# Patient Record
Sex: FEMALE | Race: WHITE | URBAN - METROPOLITAN AREA
[De-identification: names, ages, dates, MRNs, and addresses within clinical notes are randomized per-mention and may not be internally consistent; named-entity substitution may affect disease eponyms.]

---

## 2017-09-26 ENCOUNTER — OFFICE VISIT (OUTPATIENT)
Dept: ORTHOPEDIC SURGERY | Age: 52
End: 2017-09-26

## 2017-09-26 VITALS
SYSTOLIC BLOOD PRESSURE: 124 MMHG | BODY MASS INDEX: 22.49 KG/M2 | DIASTOLIC BLOOD PRESSURE: 82 MMHG | HEIGHT: 65 IN | WEIGHT: 135 LBS | HEART RATE: 76 BPM

## 2017-09-26 DIAGNOSIS — M72.2 PLANTAR FASCIITIS, BILATERAL: ICD-10-CM

## 2017-09-26 DIAGNOSIS — M79.671 BILATERAL FOOT PAIN: Primary | ICD-10-CM

## 2017-09-26 DIAGNOSIS — M79.672 BILATERAL FOOT PAIN: Primary | ICD-10-CM

## 2017-09-26 PROCEDURE — 73630 X-RAY EXAM OF FOOT: CPT | Performed by: ORTHOPAEDIC SURGERY

## 2017-09-26 PROCEDURE — 99204 OFFICE O/P NEW MOD 45 MIN: CPT | Performed by: ORTHOPAEDIC SURGERY

## 2017-09-27 ENCOUNTER — TELEPHONE (OUTPATIENT)
Dept: ORTHOPEDIC SURGERY | Age: 52
End: 2017-09-27

## 2017-09-28 DIAGNOSIS — M72.2 PLANTAR FASCIITIS, BILATERAL: Primary | ICD-10-CM

## 2017-09-28 RX ORDER — DICLOFENAC SODIUM 75 MG/1
75 TABLET, DELAYED RELEASE ORAL 2 TIMES DAILY
Qty: 60 TABLET | Refills: 2 | Status: SHIPPED | OUTPATIENT
Start: 2017-09-28

## 2017-10-02 ENCOUNTER — HOSPITAL ENCOUNTER (OUTPATIENT)
Dept: PHYSICAL THERAPY | Age: 52
Discharge: HOME | End: 2017-10-02

## 2017-10-02 NOTE — PLAN OF CARE
ultimate goal would be to get back to running. Relevant Medical History: History of Cancer   Functional Disability Index:PT G-Codes  Functional Assessment Tool Used: LEFS  Score: 25% deficit  Functional Limitation: Mobility: Walking and moving around  Mobility: Walking and Moving Around Current Status (): At least 20 percent but less than 40 percent impaired, limited or restricted  Mobility: Walking and Moving Around Goal Status (): At least 1 percent but less than 20 percent impaired, limited or restricted     Pain Scale:0-6/10  Easing factors: Resting  Provocative factors: Walking and standing; first step in the morning    Type: []Constant   [x]Intermittent  []Radiating []Localized []other:     Numbness/Tingling: Denies     Occupation/School:  at [address] [place] [address] Level of Function: Independent with ADLs and IADLs,     OBJECTIVE:     ROM  10/2 PROM AROM Comments    Left Right Left Right    Dorsiflexion   5 10    Plantarflexion   25 30    Inversion   20 25    Eversion   10 15                      Strength 10/2 Left Right Comments   Dorsiflexion 5 5    Plantarflexion 4+ 4+    Inversion 5 5    Eversion 5- 4+      Reflexes/Sensation: 10/2   [x]Dermatomes/Myotomes intact    [x]Reflexes equal and normal bilaterally   []Other:    Joint mobility: 10/2   [x]Normal    []Hypo   []Hyper     Palpation: 2/3 TTP Medial mid arch of foot bilateral  10/2    Functional Mobility/Transfers: Independent with increased pain; unable to jog or walk long distances without pain  10/2    Posture: Forward head and rounded shoulders  10/2    Gait: (include devices/WB status) Antalgic gait present; decreased heel strike  10/2                       [x] Patient history, allergies, meds reviewed. Medical chart reviewed. See intake form. 10/2    Review Of Systems (ROS):  [x]Performed Review of systems (Integumentary, CardioPulmonary, Neurological) by intake and observation.  Intake form has been scanned into medical record. Patient has been instructed to contact their primary care physician regarding ROS issues if not already being addressed at this time. 10/2    Co-morbidities/Complexities (which will affect course of rehabilitation):   [x]None           Arthritic conditions   []Rheumatoid arthritis (M05.9)  []Osteoarthritis (M19.91)   Cardiovascular conditions   []Hypertension (I10)  []Hyperlipidemia (E78.5)  []Angina pectoris (I20)  []Atherosclerosis (I70)   Musculoskeletal conditions   []Disc pathology   []Congenital spine pathologies   []Prior surgical intervention  []Osteoporosis (M81.8)  []Osteopenia (M85.8)   Endocrine conditions   []Hypothyroid (E03.9)  []Hyperthyroid Gastrointestinal conditions   []Constipation (V04.76)   Metabolic conditions   []Morbid obesity (E66.01)  []Diabetes type 1(E10.65) or 2 (E11.65)   []Neuropathy (G60.9)     Pulmonary conditions   []Asthma (J45)  []Coughing   []COPD (J44.9)   Psychological Disorders  []Anxiety (F41.9)  []Depression (F32.9)   []Other:   []Other:          Barriers to/and or personal factors that will affect rehab potential:              [x]Age  [x]Sex              [x]Motivation/Lack of Motivation                        []Co-Morbidities              []Cognitive Function, education/learning barriers              []Environmental, home barriers              []profession/work barriers  []past PT/medical experience  []other:  Justification: Patient is a healthy 47year old female who presents to the clinic with reports of bilateral foot pain. Patient is motivated to return to her PLOF as well as getting back to running. Patient reports that she is willing to try everything and will remain complaint with HEP and recommendations by physical therapist. Patient demonstrates good rehab potential due to her high level of motivation. Falls Risk Assessment (30 days):   [x] Falls Risk assessed and no intervention required.   [] Falls Risk assessed and Patient requires intervention due to being higher risk   TUG score (>12s at risk):     [] Falls education provided, including       G-Codes:  PT G-Codes  Functional Assessment Tool Used: LEFS  Score: 25% deficit  Functional Limitation: Mobility: Walking and moving around  Mobility: Walking and Moving Around Current Status (): At least 20 percent but less than 40 percent impaired, limited or restricted  Mobility: Walking and Moving Around Goal Status (): At least 1 percent but less than 20 percent impaired, limited or restricted    ASSESSMENT:    Functional Impairments:     []Decreased LE joint mobility   [x]Decreased LE functional ROM   [x]Decreased core/proximal hip strength and neuromuscular control   [x]Decreased LE functional strength  [x]Reduced balance/proprioceptive control    [x]Foot biomechanics dysfunction requiring correction: Pes Cavus on right and pes planus left   []other:    Functional Activity Limitations (from functional questionnaire and intake)   []Reduced ability to tolerate prolonged functional positions   [x]Reduced ability or difficulty with changes of positions or transfers between positions   []Reduced ability to maintain good posture and demonstrate good body mechanics with sitting, bending, and lifting   []Reduced ability to sleep   [] Reduced ability or tolerance with driving    [x]Reduced ability to squat  Participation Restrictions   []Reduced participation in self care activities   []Reduced participation in home management activities   [x]Reduced participation in work activities   [x]Reduced participation in social activities. [x]Reduced participation in sport activities. Classification :    []Signs/symptoms consistent with post-surgical status including decreased ROM, strength and function.    []Signs/symptoms consistent with joint sprain/strain   []Signs/symptoms consistent with patella-femoral syndrome   []Signs/symptoms consistent with knee OA/hip OA   []Signs/symptoms consistent with internal derangement of knee/Hip/ankle   [x]Signs/symptoms consistent with functional hip/knee/ankle-foot weakness/NMR control      []Signs/symptoms consistent with tendinitis/tendinosis    []signs/symptoms consistent with pathology which may benefit from Dry needling      [x]other:  Signs and symptoms consistent with MD diagnosis of bilateral plantar fasciitis    Prognosis/Rehab Potential:      [] Excellent   [x]Good    []Fair   []Poor     Tolerance of evaluation/treatment:    []Excellent   [x]Good    []Fair   []Poor    Physical Therapy Evaluation Complexity Justification  [x] A history of present problem with:  [x] no personal factors and/or comorbidities that impact the plan of care;  []1-2 personal factors and/or comorbidities that impact the plan of care  []3 personal factors and/or comorbidities that impact the plan of care  [x] An examination of body systems using standardized tests and measures addressing any of the following: body structures and functions (impairments), activity limitations, and/or participation restrictions;:  [] a total of 1-2 or more elements   [x] a total of 3 or more elements   [] a total of 4 or more elements   [x] A clinical presentation with:  [] stable and/or uncomplicated characteristics   [x] evolving clinical presentation with changing characteristics  [] unstable and unpredictable characteristics;   [x] Clinical decision making of [] low, [x] moderate, [] high complexity using standardized patient assessment instrument and/or measurable assessment of functional outcome.     [] EVAL (LOW) 39336 (typically 20 minutes face-to-face)  [x] EVAL (MOD) 11400 (typically 30 minutes face-to-face)  [] EVAL (HIGH) 76169 (typically 45 minutes face-to-face)  [] RE-EVAL       PLAN  Frequency/Duration:  1-2 days per week for 4-6 Weeks:  Interventions:  [x]  Therapeutic exercise including: strength training, ROM, for Lower extremity and core   [x]  NMR activation and proprioception for LE, Glutes and Core   [x]  Manual therapy as indicated for LE, Hip and spine to include: Dry Needling/IASTM, STM, PROM, Gr I-IV mobilizations, manipulation. [x] Modalities as needed that may include: thermal agents, E-stim, Biofeedback, US, iontophoresis as indicated  [x] Patient education on joint protection, postural re-education, activity modification, progression of HEP. HEP instruction: Provided and Reviewed with patient (see scanned forms)    GOALS:  Patient stated goal: Get back to running; learn how to manage on own  Therapist goals for Patient:   Short Term Goals: To be achieved in: 2 weeks  1. Independent in HEP and progression per patient tolerance, in order to prevent re-injury. 2. Patient will have a decrease in pain to facilitate improvement in movement, function, and ADLs as indicated by Functional Deficits. Long Term Goals: To be achieved in: 4-6 weeks  1. Disability index score of 10% or less for the LEFS to assist with reaching prior level of function in 6 weeks. 2. Patient will demonstrate increased AROM to WNL to allow for proper joint functioning as indicated by patients Functional Deficits in 4 weeks. 3. Patient will demonstrate an increase in Strength to good proximal hip/knee/ankle strength and control  in LE to allow for proper functional mobility as indicated by patients Functional Deficits in 6 weeks. 4. Patient will return to independent functional activities without increased symptoms or restriction in 6 weeks. 5. Patient will report running for distances greater than 2 miles with no increase in pain in 6 weeks.         Electronically signed by:  Linda Brunson PT, DPRUPERTO

## 2017-10-02 NOTE — FLOWSHEET NOTE
The [address] [place] [address] [facility], [address] [facility], [address] [place] [address]  Phone: [phone]   Fax:     [phone]    Physical Therapy Daily Treatment Note  Date:  10/2/2017    Patient Name:  Garth Reyes    :  1965  MRN: [id number]  Restrictions/Precautions:    Medical/Treatment Diagnosis Information:  Diagnosis: M72.2 (ICD-10-CM) - Plantar fasciitis, bilateral  Treatment Diagnosis: M25.571; M25.572 Pain in bilateral feet  Insurance/Certification information:  PT Insurance Information: [employer]  Physician Information:  Referring Practitioner: Mckenzie Craig  Plan of care signed (Y/N):     Date of Patient follow up with Physician:     G-Code (if applicable):      Date G-Code Applied:  10/2/17 LEFS: 25% deficit   PT G-Codes  Functional Assessment Tool Used: LEFS  Score: 25% deficit  Functional Limitation: Mobility: Walking and moving around  Mobility: Walking and Moving Around Current Status (): At least 20 percent but less than 40 percent impaired, limited or restricted  Mobility: Walking and Moving Around Goal Status ():  At least 1 percent but less than 20 percent impaired, limited or restricted    Progress Note: [x]  Yes  []  No  Next due by: Visit #10  Or 17     Latex Allergy:  [x]NO      []YES  Preferred Language for Healthcare:   [x]English       []other:    Visit # Insurance Allowable   1  10/2 60     Pain level:  0-6/10 10/2    SUBJECTIVE:  See eval  10/2    OBJECTIVE:   ROM  10/2 PROM AROM Comments    Left Right Left Right    Dorsiflexion   5 10    Plantarflexion   25 30    Inversion   20 25    Eversion   10 15                      Strength 10/2 Left Right Comments   Dorsiflexion 5 5    Plantarflexion 4+ 4+    Inversion 5 5    Eversion 5- 4+      Reflexes/Sensation: 10/2   [x]Dermatomes/Myotomes intact    [x]Reflexes equal and normal bilaterally   []Other:    Joint mobility: 10/2   [x]Normal    []Hypo   []Hyper Palpation: 2/3 TTP Medial mid arch of foot bilateral  10/2    Functional Mobility/Transfers: Independent with increased pain; unable to jog or walk long distances without pain  10/2    Posture: Forward head and rounded shoulders  10/2    Gait: (include devices/WB status) Antalgic gait present; decreased heel strike  10/2                       [x] Patient history, allergies, meds reviewed. Medical chart reviewed. See intake form. 10/2    Review Of Systems (ROS):  [x]Performed Review of systems (Integumentary, CardioPulmonary, Neurological) by intake and observation. Intake form has been scanned into medical record. Patient has been instructed to contact their primary care physician regarding ROS issues if not already being addressed at this time.   10/2    RESTRICTIONS/PRECAUTIONS: None    Exercises/Interventions:     Exercise/Equipment Resistance/Repetitions Other comments   ROM     ABC'S     BAPS     Bottle Roll     Inversion/Eversion     Ankle Pumps     Toe Curls     Rocks     Towels          Stretching     Circles     Toe Extension      Towel Pull 1 30 sec x 5 Start 10/2   Towel Pull 2 (Soleus) 30 sec x 5 Start 10/2   ERMI     Incline Stretch  NPV   Pro-Stretch     Hamstring  NPV   Stair Stretch     Calf 1     Calf 2          Isometrics     Dorsiflexion     Plantarflexion     Inversion     Eversion          PRE's     Dorsiflexion 3 x 10; blue Start 10/2   Plantarflexion 3 x 10; blue Start 10/2   Inversion 3 x 10; blue Start 10/2   Eversion 3 x 10; blue Start 10/2   Heel walk     Toe walk     SLR  NPV   Calf Raises     Step Up     Knee Extension     Hamstring Curls     Leg Press          Balance:     Rocker Board  NPV   BOSU     SLS     Aeromat  NPV   Foam Roll     Plyoback     Tandem Stance     Biodex          Bike     Treadmill          Manual interventions              Therapeutic Exercise and NMR EXR  [x] (74539) Provided verbal/tactile cueing for activities related to strengthening, flexibility, endurance, ROM for improvements in LE, proximal hip, and core control with self care, mobility, lifting, ambulation. [x] (56625) Provided verbal/tactile cueing for activities related to improving balance, coordination, kinesthetic sense, posture, motor skill, proprioception  to assist with LE, proximal hip, and core control in self care, mobility, lifting, ambulation and eccentric single leg control. NMR and Therapeutic Activities:    [] (57997 or 34613) Provided verbal/tactile cueing for activities related to improving balance, coordination, kinesthetic sense, posture, motor skill, proprioception and motor activation to allow for proper function of core, proximal hip and LE with self care and ADLs  [] (78654) Gait Re-education- Provided training and instruction to the patient for proper LE, core and proximal hip recruitment and positioning and eccentric body weight control with ambulation re-education including up and down stairs     Home Exercise Program:    [x] (26160) Reviewed/Progressed HEP activities related to strengthening, flexibility, endurance, ROM of core, proximal hip and LE for functional self-care, mobility, lifting and ambulation/stair navigation   [] (82664)Reviewed/Progressed HEP activities related to improving balance, coordination, kinesthetic sense, posture, motor skill, proprioception of core, proximal hip and LE for self care, mobility, lifting, and ambulation/stair navigation      Manual Treatments:  PROM / STM / Oscillations-Mobs:  G-I, II, III, IV (PA's, Inf., Post.)  [] (30726) Provided manual therapy to mobilize LE, proximal hip and/or LS spine soft tissue/joints for the purpose of modulating pain, promoting relaxation,  increasing ROM, reducing/eliminating soft tissue swelling/inflammation/restriction, improving soft tissue extensibility and allowing for proper ROM for normal function with self care, mobility, lifting and ambulation.      Modalities:      Charges:  Timed Code Treatment Minutes: 30 + EVAL   Total Treatment Minutes: 70     [] EVAL (LOW) 05949 (typically 20 minutes face-to-face)  [x] EVAL (MOD) 89258 (typically 30 minutes face-to-face)  [] EVAL (HIGH) 85347 (typically 45 minutes face-to-face)  [] RE-EVAL     [x] CT(79891) x  1   [] IONTO  [x] NMR (09891) x  1   [] VASO  [] Manual (55438) x       [] Other:  [] TA x       [] Mech Traction (20477)  [] ES(attended) (87210)      [] ES (un) (62870):     GOALS:   Patient stated goal: Get back to running; learn how to manage on own  Therapist goals for Patient:   Short Term Goals: To be achieved in: 2 weeks  1. Independent in HEP and progression per patient tolerance, in order to prevent re-injury. 2. Patient will have a decrease in pain to facilitate improvement in movement, function, and ADLs as indicated by Functional Deficits. Long Term Goals: To be achieved in: 4-6 weeks  1. Disability index score of 10% or less for the LEFS to assist with reaching prior level of function in 6 weeks. 2. Patient will demonstrate increased AROM to WNL to allow for proper joint functioning as indicated by patients Functional Deficits in 4 weeks. 3. Patient will demonstrate an increase in Strength to good proximal hip/knee/ankle strength and control  in LE to allow for proper functional mobility as indicated by patients Functional Deficits in 6 weeks. 4. Patient will return to independent functional activities without increased symptoms or restriction in 6 weeks. 5. Patient will report running for distances greater than 2 miles with no increase in pain in 6 weeks. Progression Towards Functional goals:  [] Patient is progressing as expected towards functional goals listed. [] Progression is slowed due to complexities listed. [] Progression has been slowed due to co-morbidities.   [x] Plan just implemented, too soon to assess goals progression  [] Other:     ASSESSMENT:  See eval    Treatment/Activity Tolerance:  [x] Patient tolerated treatment well [] Patient limited by fatique  [] Patient limited by pain  [] Patient limited by other medical complications  [] Other:     Patient education:   10/2 HEP provided and reviewed with patient; importance of supportive tennis shoes with use of orthotics      Prognosis: [x] Good [] Fair  [] Poor    Patient Requires Follow-up: [x] Yes  [] No    PLAN: See eval  [] Continue per plan of care [] Alter current plan (see comments)  [x] Plan of care initiated [] Hold pending MD visit [] Discharge    Electronically signed by: Pretty Conner PT, DPT

## 2017-10-04 ENCOUNTER — HOSPITAL ENCOUNTER (OUTPATIENT)
Dept: PHYSICAL THERAPY | Age: 52
Discharge: HOME | End: 2017-10-04

## 2017-10-04 NOTE — FLOWSHEET NOTE
[address], [address] [place] [address]  Phone: [phone]   Fax:     [phone]    Physical Therapy Daily Treatment Note  Date:  10/4/2017    Patient Name:  Kylee Naranjo    :  1965  MRN: [id number]  Restrictions/Precautions:    Medical/Treatment Diagnosis Information:  Diagnosis: M72.2 (ICD-10-CM) - Plantar fasciitis, bilateral  Treatment Diagnosis: M25.571; M25.572 Pain in bilateral feet  Insurance/Certification information:  PT Insurance Information: [employer]  Physician Information:  Referring Practitioner: Arcadio Bazan  Plan of care signed (Y/N):     Date of Patient follow up with Physician:     G-Code (if applicable):      Date G-Code Applied:  10/2/17 LEFS: 25% deficit        Progress Note: [x]  Yes  []  No  Next due by: Visit #10  Or 17     Latex Allergy:  [x]NO      []YES  Preferred Language for Healthcare:   [x]English       []other:    Visit # Insurance Allowable   2  10/4 60     Pain level:  0-6/10 10/2     SUBJECTIVE:  10/4 Patient states she is doing well but needs some guidance with her HEP. She states the frozen water bottle has been very helpful. OBJECTIVE:   ROM  10/2 PROM AROM Comments    Left Right Left Right    Dorsiflexion   5 10    Plantarflexion   25 30    Inversion   20 25    Eversion   10 15                      Strength 10/2 Left Right Comments   Dorsiflexion 5 5    Plantarflexion 4+ 4+    Inversion 5 5    Eversion 5- 4+      Reflexes/Sensation: 10/2   [x]Dermatomes/Myotomes intact    [x]Reflexes equal and normal bilaterally   []Other:    Joint mobility: 10   [x]Normal    []Hypo   []Hyper     Palpation: 2/3 TTP Medial mid arch of foot bilateral  10/2    Functional Mobility/Transfers: Independent with increased pain; unable to jog or walk long distances without pain  10/2    Posture:  Forward head and rounded shoulders  10/2    Gait: (include devices/WB status) Antalgic gait present; decreased heel strike  10/2                       [x] Patient history, allergies, meds reviewed. Medical chart reviewed. See intake form. 10/2    Review Of Systems (ROS):  [x]Performed Review of systems (Integumentary, CardioPulmonary, Neurological) by intake and observation. Intake form has been scanned into medical record. Patient has been instructed to contact their primary care physician regarding ROS issues if not already being addressed at this time. 10/2    RESTRICTIONS/PRECAUTIONS: None    Exercises/Interventions: BILATERAL    Exercise/Equipment Resistance/Repetitions Other comments   ROM     ABC'S     BAPS     Bottle Roll     Inversion/Eversion     Ankle Pumps     Toe Curls     Rocks     Towels          Stretching     Circles     Toe Extension      Towel Pull 1 30 sec x 5 Start 10/2   Towel Pull 2 (Soleus) 30 sec x 5 Start 10/2   ERMI     Incline Stretch 30 sec x 5 Start 10/4   Pro-Stretch     Hamstring  NPV   Stair Stretch     Calf 1     Calf 2          Isometrics     Dorsiflexion     Plantarflexion     Inversion     Eversion          PRE's     Dorsiflexion 3 x 10; black ^10/4   Plantarflexion 3 x 10; black ^10/4   Inversion 3 x 10; black ^10/4   Eversion 3 x 10; black ^10/4   Heel walk     Toe walk     SLR 3 x 10 Start 10/4   SLR ABD 3 x 10 Start 10/4   Calf Raises     Step Up     Knee Extension     Hamstring Curls     Leg Press          Balance:     Rocker Board 30 sec x 3 each Start 10/4   BOSU     SLS     Aeromat  NPV   Foam Roll     Plyoback     Tandem Stance     Biodex          Bike     Treadmill          Manual interventions              Therapeutic Exercise and NMR EXR  [x] (20357) Provided verbal/tactile cueing for activities related to strengthening, flexibility, endurance, ROM for improvements in LE, proximal hip, and core control with self care, mobility, lifting, ambulation.   [x] (64150) Provided verbal/tactile cueing for activities related to improving balance, coordination, kinesthetic sense, posture, motor skill, proprioception  to assist with LE, proximal hip, and core control in self care, mobility, lifting, ambulation and eccentric single leg control. NMR and Therapeutic Activities:    [x] (27520 or 79512) Provided verbal/tactile cueing for activities related to improving balance, coordination, kinesthetic sense, posture, motor skill, proprioception and motor activation to allow for proper function of core, proximal hip and LE with self care and ADLs  [] (55425) Gait Re-education- Provided training and instruction to the patient for proper LE, core and proximal hip recruitment and positioning and eccentric body weight control with ambulation re-education including up and down stairs     Home Exercise Program:    [x] (12056) Reviewed/Progressed HEP activities related to strengthening, flexibility, endurance, ROM of core, proximal hip and LE for functional self-care, mobility, lifting and ambulation/stair navigation   [] (27934)Reviewed/Progressed HEP activities related to improving balance, coordination, kinesthetic sense, posture, motor skill, proprioception of core, proximal hip and LE for self care, mobility, lifting, and ambulation/stair navigation      Manual Treatments:  PROM / STM / Oscillations-Mobs:  G-I, II, III, IV (PA's, Inf., Post.)  [] (27175) Provided manual therapy to mobilize LE, proximal hip and/or LS spine soft tissue/joints for the purpose of modulating pain, promoting relaxation,  increasing ROM, reducing/eliminating soft tissue swelling/inflammation/restriction, improving soft tissue extensibility and allowing for proper ROM for normal function with self care, mobility, lifting and ambulation.      Modalities:      Charges:  Timed Code Treatment Minutes: 45   Total Treatment Minutes: 60     [] EVAL (LOW) 35713 (typically 20 minutes face-to-face)  [] EVAL (MOD) 85143 (typically 30 minutes face-to-face)  [] EVAL (HIGH) 64727 (typically 45 minutes face-to-face)  [] RE-EVAL     [x] WM(09073) x  1   [] IONTO  [x] NMR (53805) x  2   [] VASO  [] Manual (71892) x       [] Other:  [] TA x       [] Mech Traction (03006)  [] ES(attended) (41710)      [] ES (un) (77611):     GOALS:   Patient stated goal: Get back to running; learn how to manage on own  Therapist goals for Patient:   Short Term Goals: To be achieved in: 2 weeks  1. Independent in HEP and progression per patient tolerance, in order to prevent re-injury. 2. Patient will have a decrease in pain to facilitate improvement in movement, function, and ADLs as indicated by Functional Deficits. Long Term Goals: To be achieved in: 4-6 weeks  1. Disability index score of 10% or less for the LEFS to assist with reaching prior level of function in 6 weeks. 2. Patient will demonstrate increased AROM to WNL to allow for proper joint functioning as indicated by patients Functional Deficits in 4 weeks. 3. Patient will demonstrate an increase in Strength to good proximal hip/knee/ankle strength and control  in LE to allow for proper functional mobility as indicated by patients Functional Deficits in 6 weeks. 4. Patient will return to independent functional activities without increased symptoms or restriction in 6 weeks. 5. Patient will report running for distances greater than 2 miles with no increase in pain in 6 weeks. Progression Towards Functional goals:  [x] Patient is progressing as expected towards functional goals listed. [] Progression is slowed due to complexities listed. [] Progression has been slowed due to co-morbidities.   [] Plan just implemented, too soon to assess goals progression  [] Other:     ASSESSMENT:  See eval    Treatment/Activity Tolerance:  [x] Patient tolerated treatment well [] Patient limited by fatique  [] Patient limited by pain  [] Patient limited by other medical complications  [x] Other: 10/4 Patient tolerated treatment well this session with no reports of increase in pain or symptoms. Patient was fatigued and challenged by addition of balance exercises and SLR/ SLR ABD. Continue to progress as tolerated.      Patient education:   10/2 HEP provided and reviewed with patient; importance of supportive tennis shoes with use of orthotics      Prognosis: [x] Good [] Fair  [] Poor    Patient Requires Follow-up: [x] Yes  [] No    PLAN:   [x] Continue per plan of care [] Alter current plan (see comments)  [] Plan of care initiated [] Hold pending MD visit [] Discharge    Electronically signed by: Faisal Stiles PT, DPT

## 2017-10-09 ENCOUNTER — HOSPITAL ENCOUNTER (OUTPATIENT)
Dept: PHYSICAL THERAPY | Age: 52
Discharge: HOME | End: 2017-10-09

## 2017-10-09 NOTE — FLOWSHEET NOTE
[address], [address] [place] [address] [place] , [address] [place] [address]  Phone: [phone]   Fax:     [phone]    Physical Therapy Daily Treatment Note  Date:  10/9/2017    Patient Name:  Yessy Baron    :  1965  MRN: [id number]  Restrictions/Precautions:    Medical/Treatment Diagnosis Information:  Diagnosis: M72.2 (ICD-10-CM) - Plantar fasciitis, bilateral  Treatment Diagnosis: M25.571; M25.572 Pain in bilateral feet  Insurance/Certification information:  PT Insurance Information: [place]  Physician Information:  Referring Practitioner: Richard Vallecillo  Plan of care signed (Y/N):     Date of Patient follow up with Physician:     G-Code (if applicable):      Date G-Code Applied:  10/2/17 LEFS: 25% deficit        Progress Note: [x]  Yes  []  No  Next due by: Visit #10  Or 17     Latex Allergy:  [x]NO      []YES  Preferred Language for Healthcare:   [x]English       []other:    Visit # Insurance Allowable   3  10/9 60     Pain level:  2/10 10/9     SUBJECTIVE:  10/9 Patient states that she is doing well. She states she did a lot of walking this weekend and her feet were \"not happy\" but they are okay now. OBJECTIVE:   ROM  10/2 PROM AROM Comments    Left Right Left Right    Dorsiflexion   5 10    Plantarflexion   25 30    Inversion   20 25    Eversion   10 15                      Strength 10/2 Left Right Comments   Dorsiflexion 5 5    Plantarflexion 4+ 4+    Inversion 5 5    Eversion 5- 4+      Reflexes/Sensation: 10/2   [x]Dermatomes/Myotomes intact    [x]Reflexes equal and normal bilaterally   []Other:    Joint mobility: 10   [x]Normal    []Hypo   []Hyper     Palpation: 2/3 TTP Medial mid arch of foot bilateral  10    Functional Mobility/Transfers: Independent with increased pain; unable to jog or walk long distances without pain  10    Posture:  Forward head and rounded shoulders  10    Gait: (include devices/WB status) Antalgic gait present; decreased heel strike  10/2                       [x] Patient history, allergies, meds reviewed. Medical chart reviewed. See intake form. 10/2    Review Of Systems (ROS):  [x]Performed Review of systems (Integumentary, CardioPulmonary, Neurological) by intake and observation. Intake form has been scanned into medical record. Patient has been instructed to contact their primary care physician regarding ROS issues if not already being addressed at this time. 10/2    RESTRICTIONS/PRECAUTIONS: None    Exercises/Interventions: BILATERAL    Exercise/Equipment Resistance/Repetitions Other comments   ROM     ABC'S     BAPS     Bottle Roll 2 min Start 10/9   Inversion/Eversion     Ankle Pumps     Toe Curls     Rocks     Towels          Stretching     Circles     Toe Extension      Towel Pull 1 30 sec x 5 Start 10/2   Towel Pull 2 (Soleus) 30 sec x 5 Start 10/2   ERMI     Incline Stretch 30 sec x 5 Start 10/4   Pro-Stretch     Hamstring  NPV   Stair Stretch     Calf 1     Calf 2          Isometrics     Dorsiflexion     Plantarflexion     Inversion     Eversion          PRE's     Dorsiflexion 3 x 10; black ^10/4   Plantarflexion 3 x 10; black ^10/4   Inversion 3 x 10; black ^10/4   Eversion 3 x 10; black ^10/4   Heel walk     Toe walk     SLR 3 x 10; 1.5# ^10/9   SLR ABD 3 x 10; 1.5# ^10/9   Calf Raises     Step Up     Knee Extension     Hamstring Curls     Leg Press          Balance:     Rocker Board 30 sec x 3 each Start 10/4   BOSU     SLS 30 sec x 3 each On foam; start 10/9   Aeromat     Foam Roll     Plyoback     Tandem Stance     Biodex          Bike     Treadmill          Manual interventions              Therapeutic Exercise and NMR EXR  [x] (33938) Provided verbal/tactile cueing for activities related to strengthening, flexibility, endurance, ROM for improvements in LE, proximal hip, and core control with self care, mobility, lifting, ambulation.   [x] (98243) Provided verbal/tactile cueing for activities related to improving balance, coordination, kinesthetic sense, posture, motor skill, proprioception  to assist with LE, proximal hip, and core control in self care, mobility, lifting, ambulation and eccentric single leg control. NMR and Therapeutic Activities:    [x] (67976 or 22769) Provided verbal/tactile cueing for activities related to improving balance, coordination, kinesthetic sense, posture, motor skill, proprioception and motor activation to allow for proper function of core, proximal hip and LE with self care and ADLs  [] (06039) Gait Re-education- Provided training and instruction to the patient for proper LE, core and proximal hip recruitment and positioning and eccentric body weight control with ambulation re-education including up and down stairs     Home Exercise Program:    [x] (70175) Reviewed/Progressed HEP activities related to strengthening, flexibility, endurance, ROM of core, proximal hip and LE for functional self-care, mobility, lifting and ambulation/stair navigation   [] (59155)Reviewed/Progressed HEP activities related to improving balance, coordination, kinesthetic sense, posture, motor skill, proprioception of core, proximal hip and LE for self care, mobility, lifting, and ambulation/stair navigation      Manual Treatments:  PROM / STM / Oscillations-Mobs:  G-I, II, III, IV (PA's, Inf., Post.)  [] (78369) Provided manual therapy to mobilize LE, proximal hip and/or LS spine soft tissue/joints for the purpose of modulating pain, promoting relaxation,  increasing ROM, reducing/eliminating soft tissue swelling/inflammation/restriction, improving soft tissue extensibility and allowing for proper ROM for normal function with self care, mobility, lifting and ambulation.      Modalities:      Charges:  Timed Code Treatment Minutes: 45   Total Treatment Minutes: 60     [] EVAL (LOW) 70905 (typically 20 minutes face-to-face)  [] EVAL (MOD) 87490 (typically 30 minutes face-to-face)  [] EVAL (HIGH) 89300 (typically 45 minutes face-to-face)  [] RE-EVAL     [x] JT(49053) x  1   [] IONTO  [x] NMR (19890) x  2   [] VASO  [] Manual (80978) x       [] Other:  [] TA x       [] Mech Traction (23281)  [] ES(attended) (39576)      [] ES (un) (05796):     GOALS:   Patient stated goal: Get back to running; learn how to manage on own  Therapist goals for Patient:   Short Term Goals: To be achieved in: 2 weeks  1. Independent in HEP and progression per patient tolerance, in order to prevent re-injury. 2. Patient will have a decrease in pain to facilitate improvement in movement, function, and ADLs as indicated by Functional Deficits. Long Term Goals: To be achieved in: 4-6 weeks  1. Disability index score of 10% or less for the LEFS to assist with reaching prior level of function in 6 weeks. 2. Patient will demonstrate increased AROM to WNL to allow for proper joint functioning as indicated by patients Functional Deficits in 4 weeks. 3. Patient will demonstrate an increase in Strength to good proximal hip/knee/ankle strength and control  in LE to allow for proper functional mobility as indicated by patients Functional Deficits in 6 weeks. 4. Patient will return to independent functional activities without increased symptoms or restriction in 6 weeks. 5. Patient will report running for distances greater than 2 miles with no increase in pain in 6 weeks. Progression Towards Functional goals:  [x] Patient is progressing as expected towards functional goals listed. [] Progression is slowed due to complexities listed. [] Progression has been slowed due to co-morbidities.   [] Plan just implemented, too soon to assess goals progression  [] Other:     ASSESSMENT:  See eval    Treatment/Activity Tolerance:  [x] Patient tolerated treatment well [] Patient limited by fatique  [] Patient limited by pain  [] Patient limited by other medical complications  [x] Other: 10/9 Patient tolerated treatment well this session. Challenged and fatigued by increases in weight with SLR. Plan to assess patient response to Alter-G next session.      Patient education:   10/2 HEP provided and reviewed with patient; importance of supportive tennis shoes with use of orthotics      Prognosis: [x] Good [] Fair  [] Poor    Patient Requires Follow-up: [x] Yes  [] No    PLAN:   [x] Continue per plan of care [] Alter current plan (see comments)  [] Plan of care initiated [] Hold pending MD visit [] Discharge    Electronically signed by: Tanisha Orozco PT, DPT

## 2017-10-16 ENCOUNTER — HOSPITAL ENCOUNTER (OUTPATIENT)
Dept: PHYSICAL THERAPY | Age: 52
Discharge: HOME | End: 2017-10-16

## 2017-10-16 NOTE — FLOWSHEET NOTE
sense, posture, motor skill, proprioception  to assist with LE, proximal hip, and core control in self care, mobility, lifting, ambulation and eccentric single leg control. NMR and Therapeutic Activities:    [x] (75825 or 27875) Provided verbal/tactile cueing for activities related to improving balance, coordination, kinesthetic sense, posture, motor skill, proprioception and motor activation to allow for proper function of core, proximal hip and LE with self care and ADLs  [] (99838) Gait Re-education- Provided training and instruction to the patient for proper LE, core and proximal hip recruitment and positioning and eccentric body weight control with ambulation re-education including up and down stairs     Home Exercise Program:    [x] (51259) Reviewed/Progressed HEP activities related to strengthening, flexibility, endurance, ROM of core, proximal hip and LE for functional self-care, mobility, lifting and ambulation/stair navigation   [] (76501)Reviewed/Progressed HEP activities related to improving balance, coordination, kinesthetic sense, posture, motor skill, proprioception of core, proximal hip and LE for self care, mobility, lifting, and ambulation/stair navigation      Manual Treatments:  PROM / STM / Oscillations-Mobs:  G-I, II, III, IV (PA's, Inf., Post.)  [] (15273) Provided manual therapy to mobilize LE, proximal hip and/or LS spine soft tissue/joints for the purpose of modulating pain, promoting relaxation,  increasing ROM, reducing/eliminating soft tissue swelling/inflammation/restriction, improving soft tissue extensibility and allowing for proper ROM for normal function with self care, mobility, lifting and ambulation.      Modalities:      Charges:  Timed Code Treatment Minutes: 45   Total Treatment Minutes: 55     [] EVAL (LOW) 91530 (typically 20 minutes face-to-face)  [] EVAL (MOD) 90307 (typically 30 minutes face-to-face)  [] EVAL (HIGH) 34971 (typically 45 minutes face-to-face)  [] RE-EVAL

## 2017-10-19 ENCOUNTER — HOSPITAL ENCOUNTER (OUTPATIENT)
Dept: PHYSICAL THERAPY | Age: 52
Discharge: HOME | End: 2017-10-19

## 2017-10-19 NOTE — FLOWSHEET NOTE
devices/WB status) Antalgic gait present; decreased heel strike  10/2                       [x] Patient history, allergies, meds reviewed. Medical chart reviewed. See intake form. 10/2    Review Of Systems (ROS):  [x]Performed Review of systems (Integumentary, CardioPulmonary, Neurological) by intake and observation. Intake form has been scanned into medical record. Patient has been instructed to contact their primary care physician regarding ROS issues if not already being addressed at this time. 10/2    RESTRICTIONS/PRECAUTIONS: None    Exercises/Interventions: BILATERAL    Exercise/Equipment Resistance/Repetitions Other comments   ROM     ABC'S     BAPS     Bottle Roll 2 min Start 10/9   Inversion/Eversion     Ankle Pumps     Toe Curls     Rocks     Towels          Stretching     Circles     Toe Extension      Towel Pull 1 30 sec x 5 Start 10/2   Towel Pull 2 (Soleus) 30 sec x 5 Start 10/2   ERMI     Pro-Stretch     Hamstring 30 sec x 5 Start 10/19   Stair Stretch     Calf 1     Calf 2          Isometrics     Dorsiflexion     Plantarflexion     Inversion     Eversion          PRE's     Dorsiflexion 3 x 10; silver ^10/16   Plantarflexion 3 x 10; silver ^10/16   Inversion 3 x 10; silver ^10/16   Eversion 3 x 10; silver ^10/16   Heel walk     Toe walk     Calf Raises     Step Up     Knee Extension     Hamstring Curls     Leg Press     clamshells 30 sec x 5 Start 10/19   Balance:     Aeromat     Foam Roll     Plyoback     Tandem Stance     Biodex     Bike     Treadmill          Manual interventions              Therapeutic Exercise and NMR EXR  [x] (59315) Provided verbal/tactile cueing for activities related to strengthening, flexibility, endurance, ROM for improvements in LE, proximal hip, and core control with self care, mobility, lifting, ambulation.   [x] (47164) Provided verbal/tactile cueing for activities related to improving balance, coordination, kinesthetic sense, posture, motor skill, proprioception  to assist with LE, proximal hip, and core control in self care, mobility, lifting, ambulation and eccentric single leg control. NMR and Therapeutic Activities:    [x] (47160 or 17315) Provided verbal/tactile cueing for activities related to improving balance, coordination, kinesthetic sense, posture, motor skill, proprioception and motor activation to allow for proper function of core, proximal hip and LE with self care and ADLs  [] (39690) Gait Re-education- Provided training and instruction to the patient for proper LE, core and proximal hip recruitment and positioning and eccentric body weight control with ambulation re-education including up and down stairs     Home Exercise Program:    [x] (13176) Reviewed/Progressed HEP activities related to strengthening, flexibility, endurance, ROM of core, proximal hip and LE for functional self-care, mobility, lifting and ambulation/stair navigation   [] (52801)Reviewed/Progressed HEP activities related to improving balance, coordination, kinesthetic sense, posture, motor skill, proprioception of core, proximal hip and LE for self care, mobility, lifting, and ambulation/stair navigation      Manual Treatments:  PROM / STM / Oscillations-Mobs:  G-I, II, III, IV (PA's, Inf., Post.)  [] (81432) Provided manual therapy to mobilize LE, proximal hip and/or LS spine soft tissue/joints for the purpose of modulating pain, promoting relaxation,  increasing ROM, reducing/eliminating soft tissue swelling/inflammation/restriction, improving soft tissue extensibility and allowing for proper ROM for normal function with self care, mobility, lifting and ambulation.      Modalities:      Charges:  Timed Code Treatment Minutes: 30   Total Treatment Minutes: 40     [] EVAL (LOW) 68065 (typically 20 minutes face-to-face)  [] EVAL (MOD) 92685 (typically 30 minutes face-to-face)  [] EVAL (HIGH) 73880 (typically 45 minutes face-to-face)  [] RE-EVAL     [x] JT(03813) x  1   [] IONTO  [x] NMR (51007) x  1   [] VASO  [] Manual (55543) x       [] Other:  [] TA x       [] Mech Traction (81565)  [] ES(attended) (53110)      [] ES (un) (63942):     GOALS:   Patient stated goal: Get back to running; learn how to manage on own  Therapist goals for Patient:   Short Term Goals: To be achieved in: 2 weeks  1. Independent in HEP and progression per patient tolerance, in order to prevent re-injury. 2. Patient will have a decrease in pain to facilitate improvement in movement, function, and ADLs as indicated by Functional Deficits. Long Term Goals: To be achieved in: 4-6 weeks  1. Disability index score of 10% or less for the LEFS to assist with reaching prior level of function in 6 weeks. 2. Patient will demonstrate increased AROM to WNL to allow for proper joint functioning as indicated by patients Functional Deficits in 4 weeks. 3. Patient will demonstrate an increase in Strength to good proximal hip/knee/ankle strength and control  in LE to allow for proper functional mobility as indicated by patients Functional Deficits in 6 weeks. 4. Patient will return to independent functional activities without increased symptoms or restriction in 6 weeks. 5. Patient will report running for distances greater than 2 miles with no increase in pain in 6 weeks. Progression Towards Functional goals:  [x] Patient is progressing as expected towards functional goals listed. [] Progression is slowed due to complexities listed. [] Progression has been slowed due to co-morbidities. [] Plan just implemented, too soon to assess goals progression  [] Other:     ASSESSMENT:  See eval    Treatment/Activity Tolerance:  [x] Patient tolerated treatment well [] Patient limited by fatique  [] Patient limited by pain  [] Patient limited by other medical complications  [x] Other: 10/19 Patient tolerated treatment well this session. Unable to complete all exercises this session due to time constraints.  Continue to progress as tolerated.      Patient education:   10/2 HEP provided and reviewed with patient; importance of supportive tennis shoes with use of orthotics      Prognosis: [x] Good [] Fair  [] Poor    Patient Requires Follow-up: [x] Yes  [] No    PLAN:   [x] Continue per plan of care [] Alter current plan (see comments)  [] Plan of care initiated [] Hold pending MD visit [] Discharge    Electronically signed by: Eunice Mathis PT, DPT

## 2017-10-25 ENCOUNTER — HOSPITAL ENCOUNTER (OUTPATIENT)
Dept: PHYSICAL THERAPY | Age: 52
Discharge: HOME | End: 2017-10-25

## 2017-10-25 NOTE — FLOWSHEET NOTE
[address], [address] [place] [address]  Phone: [phone]   Fax:     [phone]    Physical Therapy Daily Treatment Note  Date:  10/25/2017    Patient Name:  Tang Hnut    :  1965  MRN: [id number]  Restrictions/Precautions:    Medical/Treatment Diagnosis Information:  Diagnosis: M72.2 (ICD-10-CM) - Plantar fasciitis, bilateral  Treatment Diagnosis: M25.571; M25.572 Pain in bilateral feet  Insurance/Certification information:  PT Insurance Information: [employer]  Physician Information:  Referring Practitioner: Alistair Salter  Plan of care signed (Y/N):     Date of Patient follow up with Physician:     G-Code (if applicable):      Date G-Code Applied:  10/2/17 LEFS: 25% deficit        Progress Note: []  Yes  [x]  No  Next due by: Visit #10  Or 17     Latex Allergy:  [x]NO      []YES  Preferred Language for Healthcare:   [x]English       []other:    Visit # Insurance Allowable   6  10/25 60     Pain level:  0/10 10/25     SUBJECTIVE:  10/25 Patient states that she thinks her feet are getting better. She states she has had occasional pain in her left foot. OBJECTIVE:   ROM  10/2 PROM AROM Comments    Left Right Left Right    Dorsiflexion   5 10    Plantarflexion   25 30    Inversion   20 25    Eversion   10 15                      Strength 10/2 Left Right Comments   Dorsiflexion 5 5    Plantarflexion 4+ 4+    Inversion 5 5    Eversion 5- 4+      Reflexes/Sensation: 10/2   [x]Dermatomes/Myotomes intact    [x]Reflexes equal and normal bilaterally   []Other:    Joint mobility: 10/2   [x]Normal    []Hypo   []Hyper     Palpation: 2/3 TTP Medial mid arch of foot bilateral  10/2    Functional Mobility/Transfers: Independent with increased pain; unable to jog or walk long distances without pain  10    Posture:  Forward head and rounded shoulders  10/    Gait: (include devices/WB status) Antalgic gait present; decreased heel strike  10/2                       [x] Patient history, allergies, meds reviewed. Medical chart reviewed. See intake form. 10/2    Review Of Systems (ROS):  [x]Performed Review of systems (Integumentary, CardioPulmonary, Neurological) by intake and observation. Intake form has been scanned into medical record. Patient has been instructed to contact their primary care physician regarding ROS issues if not already being addressed at this time. 10/2    RESTRICTIONS/PRECAUTIONS: None    Exercises/Interventions: BILATERAL    Exercise/Equipment Resistance/Repetitions Other comments   ROM     ABC'S     BAPS     Bottle Roll 2 min Start 10/9   Inversion/Eversion     Ankle Pumps     Toe Curls     Rocks 3x each side Start 10/25   Towels          Stretching     Circles     Toe Extension      Towel Pull 1 30 sec x 5 Start 10/2   Towel Pull 2 (Soleus) 30 sec x 5 Start 10/2   ERMI     Incline Stretch 30 sec x 5 Pro-Stretch     Hamstring 30 sec x 5 Start 10/19   Stair Stretch     Calf 1     Calf 2          Isometrics     Dorsiflexion     Plantarflexion     Inversion     Eversion          PRE's     Dorsiflexion 3 x 10; silver ^10/16   Plantarflexion 3 x 10; silver ^10/16   Inversion 3 x 10; silver ^10/16   Eversion 3 x 10; silver ^10/16   Heel walk     Toe walk     SLR 3 x 10; 2# ^10/25 SLR ADD 3 x 10; 2# Start 10/25   SLR ABD 3 x 10; 2# ^10/25 Calf Raises     Step Up     Knee Extension     Hamstring Curls     Leg Press     clamshells 3 x 10 Start 10/19   Balance:     Rocker Board 30 sec x 3 each Start 10/4   Aeromat     Foam Roll     Plyoback     Tandem Stance     Biodex     Bike     Treadmill          Manual interventions              Therapeutic Exercise and NMR EXR  [x] (43942) Provided verbal/tactile cueing for activities related to strengthening, flexibility, endurance, ROM for improvements in LE, proximal hip, and core control with self care, mobility, lifting, ambulation.   [x] (82992) Provided verbal/tactile cueing for activities related to improving balance, coordination, kinesthetic sense, posture, motor skill, proprioception  to assist with LE, proximal hip, and core control in self care, mobility, lifting, ambulation and eccentric single leg control. NMR and Therapeutic Activities:    [x] (54831 or 22841) Provided verbal/tactile cueing for activities related to improving balance, coordination, kinesthetic sense, posture, motor skill, proprioception and motor activation to allow for proper function of core, proximal hip and LE with self care and ADLs  [] (00964) Gait Re-education- Provided training and instruction to the patient for proper LE, core and proximal hip recruitment and positioning and eccentric body weight control with ambulation re-education including up and down stairs     Home Exercise Program:    [x] (81818) Reviewed/Progressed HEP activities related to strengthening, flexibility, endurance, ROM of core, proximal hip and LE for functional self-care, mobility, lifting and ambulation/stair navigation   [] (91981)Reviewed/Progressed HEP activities related to improving balance, coordination, kinesthetic sense, posture, motor skill, proprioception of core, proximal hip and LE for self care, mobility, lifting, and ambulation/stair navigation      Manual Treatments:  PROM / STM / Oscillations-Mobs:  G-I, II, III, IV (PA's, Inf., Post.)  [] (25885) Provided manual therapy to mobilize LE, proximal hip and/or LS spine soft tissue/joints for the purpose of modulating pain, promoting relaxation,  increasing ROM, reducing/eliminating soft tissue swelling/inflammation/restriction, improving soft tissue extensibility and allowing for proper ROM for normal function with self care, mobility, lifting and ambulation.      Modalities:      Charges:  Timed Code Treatment Minutes: 45   Total Treatment Minutes: 60     [] EVAL (LOW) 82765 (typically 20 minutes face-to-face)  [] EVAL (MOD) 62162 (typically 30 minutes face-to-face)  [] EVAL (HIGH) 82703 (typically 45 minutes face-to-face)  [] RE-EVAL     [x] AN(60769) x  1   [] IONTO  [x] NMR (91356) x  1   [] VASO  [] Manual (44993) x       [] Other:  [x] TA x  1    [] Mech Traction (82157)  [] ES(attended) (98434)      [] ES (un) (83636):     GOALS:   Patient stated goal: Get back to running; learn how to manage on own  Therapist goals for Patient:   Short Term Goals: To be achieved in: 2 weeks  1. Independent in HEP and progression per patient tolerance, in order to prevent re-injury. 2. Patient will have a decrease in pain to facilitate improvement in movement, function, and ADLs as indicated by Functional Deficits. Long Term Goals: To be achieved in: 4-6 weeks  1. Disability index score of 10% or less for the LEFS to assist with reaching prior level of function in 6 weeks. 2. Patient will demonstrate increased AROM to WNL to allow for proper joint functioning as indicated by patients Functional Deficits in 4 weeks. 3. Patient will demonstrate an increase in Strength to good proximal hip/knee/ankle strength and control  in LE to allow for proper functional mobility as indicated by patients Functional Deficits in 6 weeks. 4. Patient will return to independent functional activities without increased symptoms or restriction in 6 weeks. 5. Patient will report running for distances greater than 2 miles with no increase in pain in 6 weeks. Progression Towards Functional goals:  [x] Patient is progressing as expected towards functional goals listed. [] Progression is slowed due to complexities listed. [] Progression has been slowed due to co-morbidities.   [] Plan just implemented, too soon to assess goals progression  [] Other:     ASSESSMENT:  See eval    Treatment/Activity Tolerance:  [x] Patient tolerated treatment well [] Patient limited by fatique  [] Patient limited by pain  [] Patient limited by other medical complications  [x] Other: 10/25

## 2017-10-31 ENCOUNTER — HOSPITAL ENCOUNTER (OUTPATIENT)
Dept: PHYSICAL THERAPY | Age: 52
Discharge: HOME | End: 2017-10-31
Admitting: ORTHOPAEDIC SURGERY

## 2017-10-31 NOTE — FLOWSHEET NOTE
by pain  [] Patient limited by other medical complications  [x] Other: 10/31 Patient tolerated treatment well this session. Patient fatigued at end of session. Difficulty with rock exercise. Monitor response to exercises next session. Continue to progress as tolerated.      Patient education:   10/2 HEP provided and reviewed with patient; importance of supportive tennis shoes with use of orthotics      Prognosis: [x] Good [] Fair  [] Poor    Patient Requires Follow-up: [x] Yes  [] No    PLAN:   [x] Continue per plan of care [] Alter current plan (see comments)  [] Plan of care initiated [] Hold pending MD visit [] Discharge    Electronically signed by: Siobhan Patel PT, DPT

## 2017-11-01 ENCOUNTER — HOSPITAL ENCOUNTER (OUTPATIENT)
Dept: PHYSICAL THERAPY | Age: 52
Discharge: HOME | End: 2017-11-30
Attending: ORTHOPAEDIC SURGERY | Admitting: ORTHOPAEDIC SURGERY

## 2017-11-02 ENCOUNTER — HOSPITAL ENCOUNTER (OUTPATIENT)
Dept: PHYSICAL THERAPY | Age: 52
Discharge: HOME | End: 2017-11-02
Admitting: ORTHOPAEDIC SURGERY

## 2017-11-02 NOTE — FLOWSHEET NOTE
[address], [address] [place] [address]  Phone: [phone]   Fax:     [phone]    Physical Therapy Daily Treatment Note  Date:  2017    Patient Name:  Baltazar Lamar    :  1965  MRN: [id number]  Restrictions/Precautions:    Medical/Treatment Diagnosis Information:  Diagnosis: M72.2 (ICD-10-CM) - Plantar fasciitis, bilateral  Treatment Diagnosis: M25.571; M25.572 Pain in bilateral feet  Insurance/Certification information:  PT Insurance Information: [employer]  Physician Information:  Referring Practitioner: Jose Barnhart  Plan of care signed (Y/N):     Date of Patient follow up with Physician:     G-Code (if applicable):      Date G-Code Applied:  10/2/17 LEFS: 25% deficit        Progress Note: []  Yes  [x]  No  Next due by: Visit #10  Or 17     Latex Allergy:  [x]NO      []YES  Preferred Language for Healthcare:   [x]English       []other:    Visit # Insurance Allowable   8   60     Pain level:  6/10 11/2    SUBJECTIVE:   Patient states that her heel is still bothering her. She states that it feels about the same. OBJECTIVE:   ROM  10/2 PROM AROM Comments    Left Right Left Right    Dorsiflexion   5 10    Plantarflexion   25 30    Inversion   20 25    Eversion   10 15                      Strength 10/2 Left Right Comments   Dorsiflexion 5 5    Plantarflexion 4+ 4+    Inversion 5 5    Eversion 5- 4+      Reflexes/Sensation: 10/2   [x]Dermatomes/Myotomes intact    [x]Reflexes equal and normal bilaterally   []Other:    Joint mobility: 10/2   [x]Normal    []Hypo   []Hyper     Palpation: 2/3 TTP Medial mid arch of foot bilateral  10/2    Functional Mobility/Transfers: Independent with increased pain; unable to jog or walk long distances without pain  10/2    Posture:  Forward head and rounded shoulders  10/2    Gait: (include devices/WB status) Antalgic gait present; decreased heel strike this session with increased TTP mid foot on left and medial arch on right. Monitor response next session. Continue to progress as tolerated.      Patient education:   10/2 HEP provided and reviewed with patient; importance of supportive tennis shoes with use of orthotics      Prognosis: [x] Good [] Fair  [] Poor    Patient Requires Follow-up: [x] Yes  [] No    PLAN:   [x] Continue per plan of care [] Alter current plan (see comments)  [] Plan of care initiated [] Hold pending MD visit [] Discharge    Electronically signed by: Rylan Grande PT, DPT

## 2017-11-14 ENCOUNTER — HOSPITAL ENCOUNTER (OUTPATIENT)
Dept: PHYSICAL THERAPY | Age: 52
Discharge: HOME | End: 2017-11-14
Admitting: ORTHOPAEDIC SURGERY

## 2017-11-14 NOTE — FLOWSHEET NOTE
[address], [address] [place] [address]  Phone: [phone]   Fax:     [phone]    Physical Therapy Daily Treatment Note  Date:  2017    Patient Name:  Baltazar Lamar    :  1965  MRN: [id number]  Restrictions/Precautions:    Medical/Treatment Diagnosis Information:  Diagnosis: M72.2 (ICD-10-CM) - Plantar fasciitis, bilateral  Treatment Diagnosis: M25.571; M25.572 Pain in bilateral feet  Insurance/Certification information:  PT Insurance Information: [employer]  Physician Information:  Referring Practitioner: Jose Barnhart  Plan of care signed (Y/N):     Date of Patient follow up with Physician:     G-Code (if applicable):      Date G-Code Applied:  10/2/17 LEFS: 25% deficit        Progress Note: []  Yes  [x]  No  Next due by: Visit #10  Or 17     Latex Allergy:  [x]NO      []YES  Preferred Language for Healthcare:   [x]English       []other:    Visit # Insurance Allowable    60     Pain level:  4/10 11/14    SUBJECTIVE:   Patient states that she is feeling slightly better but the left heel is still bothering her. OBJECTIVE:   ROM  10/2 PROM AROM Comments    Left Right Left Right    Dorsiflexion   5 10    Plantarflexion   25 30    Inversion   20 25    Eversion   10 15                      Strength 10/2 Left Right Comments   Dorsiflexion 5 5    Plantarflexion 4+ 4+    Inversion 5 5    Eversion 5- 4+      Reflexes/Sensation: 10/2   [x]Dermatomes/Myotomes intact    [x]Reflexes equal and normal bilaterally   []Other:    Joint mobility: 10/2   [x]Normal    []Hypo   []Hyper     Palpation: 2/3 TTP Medial mid arch of foot bilateral  10/2    Functional Mobility/Transfers: Independent with increased pain; unable to jog or walk long distances without pain  10/2    Posture:  Forward head and rounded shoulders  10/2    Gait: (include devices/WB status) Antalgic gait present; decreased heel strike GOALS:   Patient stated goal: Get back to running; learn how to manage on own  Therapist goals for Patient:   Short Term Goals: To be achieved in: 2 weeks  1. Independent in HEP and progression per patient tolerance, in order to prevent re-injury. 2. Patient will have a decrease in pain to facilitate improvement in movement, function, and ADLs as indicated by Functional Deficits. Long Term Goals: To be achieved in: 4-6 weeks  1. Disability index score of 10% or less for the LEFS to assist with reaching prior level of function in 6 weeks. 2. Patient will demonstrate increased AROM to WNL to allow for proper joint functioning as indicated by patients Functional Deficits in 4 weeks. 3. Patient will demonstrate an increase in Strength to good proximal hip/knee/ankle strength and control  in LE to allow for proper functional mobility as indicated by patients Functional Deficits in 6 weeks. 4. Patient will return to independent functional activities without increased symptoms or restriction in 6 weeks. 5. Patient will report running for distances greater than 2 miles with no increase in pain in 6 weeks. Progression Towards Functional goals:  [x] Patient is progressing as expected towards functional goals listed. [] Progression is slowed due to complexities listed. [] Progression has been slowed due to co-morbidities. [] Plan just implemented, too soon to assess goals progression  [] Other:     ASSESSMENT:  See eval    Treatment/Activity Tolerance:  [x] Patient tolerated treatment well [] Patient limited by fatique  [] Patient limited by pain  [] Patient limited by other medical complications  [x] Other: 11/14 Patient tolerated treatment well this session. Good tolerance to hawk  this session with patient reporting increased flexibility upon completion. Plan to discuss DN with MD and assess tolerance to hawk  next session. Patient agreed with plan.      Patient education:   10/2 HEP provided and reviewed with patient; importance of supportive tennis shoes with use of orthotics      Prognosis: [x] Good [] Fair  [] Poor    Patient Requires Follow-up: [x] Yes  [] No    PLAN:   [x] Continue per plan of care [] Alter current plan (see comments)  [] Plan of care initiated [] Hold pending MD visit [] Discharge    Electronically signed by: Tiago Lua PT, DPT

## 2017-11-16 ENCOUNTER — HOSPITAL ENCOUNTER (OUTPATIENT)
Dept: PHYSICAL THERAPY | Age: 52
Discharge: HOME | End: 2017-11-16
Admitting: ORTHOPAEDIC SURGERY

## 2017-11-16 NOTE — FLOWSHEET NOTE
[facility], [address] [employer] [address] [place] [address], [address] [place] [address]  Phone: [phone]   Fax:     [phone]    Physical Therapy Daily Treatment Note  Date:  2017    Patient Name:  Ulysses Quinones    :  1965  MRN: [id number]  Restrictions/Precautions:    Medical/Treatment Diagnosis Information:  Diagnosis: M72.2 (ICD-10-CM) - Plantar fasciitis, bilateral  Treatment Diagnosis: M25.571; M25.572 Pain in bilateral feet  Insurance/Certification information:  PT Insurance Information: [employer]  Physician Information:  Referring Practitioner: Ayse Kumar  Plan of care signed (Y/N):     Date of Patient follow up with Physician:      G-Code (if applicable):      Date G-Code Applied:  10/2/17 LEFS: 25% deficit        Progress Note: []  Yes  [x]  No  Next due by: Visit #10  Or 17     Latex Allergy:  [x]NO      []YES  Preferred Language for Healthcare:   [x]English       []other:    Visit # Insurance Allowable   10  11/16 60     Pain level:  3/10 11/16    SUBJECTIVE:    Patient states that the right foot is feeling pretty good but her left foot continues to bother her. OBJECTIVE:   ROM  10/2 PROM AROM Comments    Left Right Left Right    Dorsiflexion   5 10    Plantarflexion   25 30    Inversion   20 25    Eversion   10 15                      Strength 10/2 Left Right Comments   Dorsiflexion 5 5    Plantarflexion 4+ 4+    Inversion 5 5    Eversion 5- 4+      Reflexes/Sensation: 10   [x]Dermatomes/Myotomes intact    [x]Reflexes equal and normal bilaterally   []Other:    Joint mobility: 10   [x]Normal    []Hypo   []Hyper     Palpation: 2/3 TTP Medial mid arch of foot bilateral  10/    Functional Mobility/Transfers: Independent with increased pain; unable to jog or walk long distances without pain  10    Posture:  Forward head and rounded shoulders  10    Gait: (include devices/WB status) Antalgic gait present; decreased heel (00424) x  1   [] VASO  [x] Manual (96372) x  1    [] Other:  [] TA x       [] Mech Traction (19502)  [] ES(attended) (65095)      [] ES (un) (63226):     GOALS:   Patient stated goal: Get back to running; learn how to manage on own  Therapist goals for Patient:   Short Term Goals: To be achieved in: 2 weeks  1. Independent in HEP and progression per patient tolerance, in order to prevent re-injury. 2. Patient will have a decrease in pain to facilitate improvement in movement, function, and ADLs as indicated by Functional Deficits. Long Term Goals: To be achieved in: 4-6 weeks  1. Disability index score of 10% or less for the LEFS to assist with reaching prior level of function in 6 weeks. 2. Patient will demonstrate increased AROM to WNL to allow for proper joint functioning as indicated by patients Functional Deficits in 4 weeks. 3. Patient will demonstrate an increase in Strength to good proximal hip/knee/ankle strength and control  in LE to allow for proper functional mobility as indicated by patients Functional Deficits in 6 weeks. 4. Patient will return to independent functional activities without increased symptoms or restriction in 6 weeks. 5. Patient will report running for distances greater than 2 miles with no increase in pain in 6 weeks. Progression Towards Functional goals:  [x] Patient is progressing as expected towards functional goals listed. [] Progression is slowed due to complexities listed. [] Progression has been slowed due to co-morbidities. [] Plan just implemented, too soon to assess goals progression  [] Other:     ASSESSMENT:  See eval    Treatment/Activity Tolerance:  [x] Patient tolerated treatment well [] Patient limited by fatique  [] Patient limited by pain  [] Patient limited by other medical complications  [x] Other: 11/16 Patient tolerated treatment well this session. Good tolerance to bands but reported weakness and mild pain with calf raises.  Patient has increased TTP medial plantar aspect of left foot with increased redness and petechiae noted in bilateral feet. Assess next session; plan to introduce DN into plan per MD approval. Continue to progress as tolerated.        Patient education:   10/2 HEP provided and reviewed with patient; importance of supportive tennis shoes with use of orthotics      Prognosis: [x] Good [] Fair  [] Poor    Patient Requires Follow-up: [x] Yes  [] No    PLAN:   [x] Continue per plan of care [] Alter current plan (see comments)  [] Plan of care initiated [] Hold pending MD visit [] Discharge    Electronically signed by: Remington Friday PT, DPT

## 2017-12-01 ENCOUNTER — HOSPITAL ENCOUNTER (OUTPATIENT)
Dept: PHYSICAL THERAPY | Age: 52
Discharge: HOME | End: 2017-12-31
Attending: ORTHOPAEDIC SURGERY | Admitting: ORTHOPAEDIC SURGERY

## 2017-12-12 ENCOUNTER — HOSPITAL ENCOUNTER (OUTPATIENT)
Dept: PHYSICAL THERAPY | Age: 52
Discharge: HOME | End: 2017-12-12
Admitting: ORTHOPAEDIC SURGERY

## 2017-12-12 NOTE — DISCHARGE SUMMARY
Provided verbal/tactile cueing for activities related to strengthening, flexibility, endurance, ROM for improvements in LE, proximal hip, and core control with self care, mobility, lifting, ambulation. [x] (56912) Provided verbal/tactile cueing for activities related to improving balance, coordination, kinesthetic sense, posture, motor skill, proprioception  to assist with LE, proximal hip, and core control in self care, mobility, lifting, ambulation and eccentric single leg control.      NMR and Therapeutic Activities:    [x] (32126 or 56015) Provided verbal/tactile cueing for activities related to improving balance, coordination, kinesthetic sense, posture, motor skill, proprioception and motor activation to allow for proper function of core, proximal hip and LE with self care and ADLs  [] (48616) Gait Re-education- Provided training and instruction to the patient for proper LE, core and proximal hip recruitment and positioning and eccentric body weight control with ambulation re-education including up and down stairs     Home Exercise Program:    [x] (69077) Reviewed/Progressed HEP activities related to strengthening, flexibility, endurance, ROM of core, proximal hip and LE for functional self-care, mobility, lifting and ambulation/stair navigation   [] (69921)Reviewed/Progressed HEP activities related to improving balance, coordination, kinesthetic sense, posture, motor skill, proprioception of core, proximal hip and LE for self care, mobility, lifting, and ambulation/stair navigation      Manual Treatments:  PROM / STM / Oscillations-Mobs:  G-I, II, III, IV (PA's, Inf., Post.)  [] (14539) Provided manual therapy to mobilize LE, proximal hip and/or LS spine soft tissue/joints for the purpose of modulating pain, promoting relaxation,  increasing ROM, reducing/eliminating soft tissue swelling/inflammation/restriction, improving soft tissue extensibility and allowing for proper ROM for normal function with self care, mobility, lifting and ambulation. Modalities:      Charges:  Timed Code Treatment Minutes: 45   Total Treatment Minutes: 48     [] EVAL (LOW) 58929 (typically 20 minutes face-to-face)  [] EVAL (MOD) 96165 (typically 30 minutes face-to-face)  [] EVAL (HIGH) 75284 (typically 45 minutes face-to-face)  [] RE-EVAL     [x] WD(37048) x  2   [] IONTO  [x] NMR (90421) x  1   [] VASO  [] Manual (63675) x       [] Other:  [] TA x       [] Mech Traction (02076)  [] ES(attended) (26193)      [] ES (un) (51166):     GOALS:   Patient stated goal: Get back to running; learn how to manage on own  Therapist goals for Patient:   Short Term Goals: To be achieved in: 2 weeks  1. Independent in HEP and progression per patient tolerance, in order to prevent re-injury. MET  2. Patient will have a decrease in pain to facilitate improvement in movement, function, and ADLs as indicated by Functional Deficits. MET    Long Term Goals: To be achieved in: 4-6 weeks  1. Disability index score of 10% or less for the LEFS to assist with reaching prior level of function in 6 weeks. MET  2. Patient will demonstrate increased AROM to WNL to allow for proper joint functioning as indicated by patients Functional Deficits in 4 weeks. MET  3. Patient will demonstrate an increase in Strength to good proximal hip/knee/ankle strength and control  in LE to allow for proper functional mobility as indicated by patients Functional Deficits in 6 weeks. MET  4. Patient will return to independent functional activities without increased symptoms or restriction in 6 weeks. MET   5. Patient will report running for distances greater than 2 miles with no increase in pain in 6 weeks. MET     Progression Towards Functional goals:  [x] Patient is progressing as expected towards functional goals listed. [] Progression is slowed due to complexities listed. [] Progression has been slowed due to co-morbidities.   [] Plan just implemented, too soon to assess goals progression  [] Other:     ASSESSMENT:  See eval    Treatment/Activity Tolerance:  [x] Patient tolerated treatment well [] Patient limited by fatique  [] Patient limited by pain  [] Patient limited by other medical complications  [x] Other: 12/12 Patient has progressed well in therapy and has decreased pain and improved ROM. Strength, and neuromuscular control. Patient has met all short term and long term goals and is confident that she can maintain current status at home with her HEP. Patient no longer requires skilled physical therapy at this time. Discharge from physical therapy.      Patient education:   10/2 HEP provided and reviewed with patient; importance of supportive tennis shoes with use of orthotics      Prognosis: [x] Good [] Fair  [] Poor    Patient Requires Follow-up: [] Yes  [x] No    PLAN:   [] Continue per plan of care [] Alter current plan (see comments)  [] Plan of care initiated [] Hold pending MD visit [x] Discharge    Electronically signed by: Vivian Siddiqui PT, DPT

## 2018-01-01 ENCOUNTER — HOSPITAL ENCOUNTER (OUTPATIENT)
Dept: PHYSICAL THERAPY | Age: 53
Discharge: HOME | End: 2018-01-31
Attending: ORTHOPAEDIC SURGERY | Admitting: ORTHOPAEDIC SURGERY